# Patient Record
Sex: FEMALE | Race: BLACK OR AFRICAN AMERICAN | NOT HISPANIC OR LATINO | Employment: FULL TIME | URBAN - METROPOLITAN AREA
[De-identification: names, ages, dates, MRNs, and addresses within clinical notes are randomized per-mention and may not be internally consistent; named-entity substitution may affect disease eponyms.]

---

## 2023-07-06 DIAGNOSIS — E78.5 HYPERLIPIDEMIA, UNSPECIFIED HYPERLIPIDEMIA TYPE: ICD-10-CM

## 2023-07-06 RX ORDER — ATORVASTATIN CALCIUM 40 MG/1
40 TABLET, FILM COATED ORAL NIGHTLY
Qty: 30 TABLET | Refills: 0 | Status: SHIPPED | OUTPATIENT
Start: 2023-07-06 | End: 2023-08-14 | Stop reason: SDUPTHER

## 2023-07-06 RX ORDER — ATORVASTATIN CALCIUM 40 MG/1
40 TABLET, FILM COATED ORAL NIGHTLY
COMMUNITY
End: 2023-07-06 | Stop reason: SDUPTHER

## 2023-08-09 DIAGNOSIS — I10 HYPERTENSION, UNSPECIFIED TYPE: ICD-10-CM

## 2023-08-09 PROBLEM — J34.89 NASAL CRUSTING: Status: ACTIVE | Noted: 2023-08-09

## 2023-08-09 PROBLEM — J30.1 ALLERGIC RHINITIS DUE TO POLLEN: Status: ACTIVE | Noted: 2023-08-09

## 2023-08-09 PROBLEM — J32.3 SPHENOID SINUSITIS: Status: ACTIVE | Noted: 2023-08-09

## 2023-08-09 PROBLEM — M19.011 DEGENERATIVE JOINT DISEASE OF RIGHT ACROMIOCLAVICULAR JOINT: Status: ACTIVE | Noted: 2023-08-09

## 2023-08-09 PROBLEM — J32.4 CHRONIC PANSINUSITIS: Status: ACTIVE | Noted: 2023-08-09

## 2023-08-09 PROBLEM — F17.200 TOBACCO USE DISORDER: Status: ACTIVE | Noted: 2023-08-09

## 2023-08-09 PROBLEM — J20.9 ACUTE BRONCHITIS: Status: ACTIVE | Noted: 2023-08-09

## 2023-08-09 PROBLEM — R94.39 ABNORMAL CARDIOVASCULAR STRESS TEST: Status: ACTIVE | Noted: 2023-08-09

## 2023-08-09 PROBLEM — R94.31 EKG, ABNORMAL: Status: ACTIVE | Noted: 2023-08-09

## 2023-08-09 PROBLEM — R06.83 SNORING: Status: ACTIVE | Noted: 2023-08-09

## 2023-08-09 PROBLEM — S92.024A CLOSED NONDISPLACED FRACTURE OF ANTERIOR PROCESS OF RIGHT CALCANEUS: Status: ACTIVE | Noted: 2023-08-09

## 2023-08-09 PROBLEM — G43.109 MIGRAINE WITH AURA AND WITHOUT STATUS MIGRAINOSUS, NOT INTRACTABLE: Status: ACTIVE | Noted: 2023-08-09

## 2023-08-09 PROBLEM — R73.9 ELEVATED SERUM GLUCOSE: Status: ACTIVE | Noted: 2023-08-09

## 2023-08-09 PROBLEM — K64.9 HEMORRHOIDS: Status: ACTIVE | Noted: 2023-08-09

## 2023-08-09 PROBLEM — E66.9 OBESITY (BMI 30.0-34.9): Status: ACTIVE | Noted: 2023-08-09

## 2023-08-09 PROBLEM — E66.811 OBESITY (BMI 30.0-34.9): Status: ACTIVE | Noted: 2023-08-09

## 2023-08-09 PROBLEM — L71.9 ROSACEA: Status: ACTIVE | Noted: 2023-08-09

## 2023-08-09 PROBLEM — L57.0 AK (ACTINIC KERATOSIS): Status: ACTIVE | Noted: 2023-08-09

## 2023-08-09 PROBLEM — I67.1 NONRUPTURED CEREBRAL ANEURYSM (HHS-HCC): Status: ACTIVE | Noted: 2023-08-09

## 2023-08-09 PROBLEM — M25.512 LEFT SHOULDER PAIN: Status: ACTIVE | Noted: 2023-08-09

## 2023-08-09 PROBLEM — N95.2 VAGINAL ATROPHY: Status: ACTIVE | Noted: 2023-08-09

## 2023-08-09 PROBLEM — J34.2 DEVIATED SEPTUM: Status: ACTIVE | Noted: 2023-08-09

## 2023-08-09 PROBLEM — J34.3 HYPERTROPHY OF NASAL TURBINATES: Status: ACTIVE | Noted: 2023-08-09

## 2023-08-09 PROBLEM — R21 RASH/SKIN ERUPTION: Status: ACTIVE | Noted: 2023-08-09

## 2023-08-09 PROBLEM — E78.5 HYPERLIPEMIA: Status: ACTIVE | Noted: 2023-08-09

## 2023-08-09 RX ORDER — IPRATROPIUM BROMIDE AND ALBUTEROL SULFATE 2.5; .5 MG/3ML; MG/3ML
SOLUTION RESPIRATORY (INHALATION)
COMMUNITY
Start: 2019-06-16

## 2023-08-09 RX ORDER — ESTRADIOL 0.1 MG/G
CREAM VAGINAL
COMMUNITY
Start: 2021-07-15

## 2023-08-09 RX ORDER — TOPIRAMATE 100 MG/1
TABLET, FILM COATED ORAL
COMMUNITY
Start: 2021-03-31 | End: 2024-04-03

## 2023-08-09 RX ORDER — FLUTICASONE PROPIONATE 50 MCG
SPRAY, SUSPENSION (ML) NASAL
COMMUNITY

## 2023-08-09 RX ORDER — ASPIRIN 81 MG/1
TABLET ORAL
COMMUNITY
Start: 2019-01-18

## 2023-08-09 RX ORDER — LISINOPRIL AND HYDROCHLOROTHIAZIDE 10; 12.5 MG/1; MG/1
1 TABLET ORAL
COMMUNITY
Start: 2019-05-29 | End: 2023-08-09 | Stop reason: SDUPTHER

## 2023-08-09 RX ORDER — ACETAMINOPHEN 500 MG
500 TABLET ORAL EVERY 6 HOURS PRN
COMMUNITY
Start: 2023-03-23

## 2023-08-09 RX ORDER — IBUPROFEN 600 MG/1
TABLET ORAL
COMMUNITY
Start: 2023-03-23

## 2023-08-09 RX ORDER — MINERAL OIL
ENEMA (ML) RECTAL
COMMUNITY

## 2023-08-09 RX ORDER — CAPSAICIN 0.03 G/100G
CREAM TOPICAL
COMMUNITY
Start: 2019-01-14

## 2023-08-10 RX ORDER — LISINOPRIL AND HYDROCHLOROTHIAZIDE 10; 12.5 MG/1; MG/1
1 TABLET ORAL
Qty: 30 TABLET | Refills: 0 | Status: SHIPPED | OUTPATIENT
Start: 2023-08-10 | End: 2023-08-11 | Stop reason: SDUPTHER

## 2023-08-11 DIAGNOSIS — I10 HYPERTENSION, UNSPECIFIED TYPE: ICD-10-CM

## 2023-08-11 DIAGNOSIS — E78.5 HYPERLIPIDEMIA, UNSPECIFIED HYPERLIPIDEMIA TYPE: ICD-10-CM

## 2023-08-14 RX ORDER — ATORVASTATIN CALCIUM 40 MG/1
40 TABLET, FILM COATED ORAL NIGHTLY
Qty: 30 TABLET | Refills: 0 | Status: SHIPPED | OUTPATIENT
Start: 2023-08-14 | End: 2023-09-22 | Stop reason: SDUPTHER

## 2023-08-14 RX ORDER — LISINOPRIL AND HYDROCHLOROTHIAZIDE 10; 12.5 MG/1; MG/1
1 TABLET ORAL
Qty: 30 TABLET | Refills: 0 | Status: SHIPPED | OUTPATIENT
Start: 2023-08-14 | End: 2024-04-03 | Stop reason: ALTCHOICE

## 2023-09-20 DIAGNOSIS — E78.5 HYPERLIPIDEMIA, UNSPECIFIED HYPERLIPIDEMIA TYPE: ICD-10-CM

## 2023-09-20 RX ORDER — ATORVASTATIN CALCIUM 40 MG/1
40 TABLET, FILM COATED ORAL NIGHTLY
Qty: 30 TABLET | Refills: 0 | OUTPATIENT
Start: 2023-09-20

## 2023-09-22 DIAGNOSIS — E78.5 HYPERLIPIDEMIA, UNSPECIFIED HYPERLIPIDEMIA TYPE: ICD-10-CM

## 2023-09-22 RX ORDER — ATORVASTATIN CALCIUM 40 MG/1
40 TABLET, FILM COATED ORAL NIGHTLY
Qty: 30 TABLET | Refills: 0 | Status: SHIPPED | OUTPATIENT
Start: 2023-09-22 | End: 2024-04-03 | Stop reason: ALTCHOICE

## 2024-03-27 ENCOUNTER — HOSPITAL ENCOUNTER (OUTPATIENT)
Dept: RADIOLOGY | Facility: CLINIC | Age: 64
Discharge: HOME | End: 2024-03-27
Payer: COMMERCIAL

## 2024-03-27 ENCOUNTER — APPOINTMENT (OUTPATIENT)
Dept: RADIOLOGY | Facility: CLINIC | Age: 64
End: 2024-03-27
Payer: COMMERCIAL

## 2024-03-27 VITALS — WEIGHT: 119 LBS | BODY MASS INDEX: 20.32 KG/M2 | HEIGHT: 64 IN

## 2024-03-27 DIAGNOSIS — Z12.31 SCREENING MAMMOGRAM FOR BREAST CANCER: ICD-10-CM

## 2024-03-27 PROCEDURE — 77067 SCR MAMMO BI INCL CAD: CPT

## 2024-03-27 PROCEDURE — 77063 BREAST TOMOSYNTHESIS BI: CPT | Performed by: RADIOLOGY

## 2024-03-27 PROCEDURE — 77067 SCR MAMMO BI INCL CAD: CPT | Performed by: RADIOLOGY

## 2024-04-03 ENCOUNTER — OFFICE VISIT (OUTPATIENT)
Dept: PRIMARY CARE | Facility: CLINIC | Age: 64
End: 2024-04-03
Payer: COMMERCIAL

## 2024-04-03 ENCOUNTER — APPOINTMENT (OUTPATIENT)
Dept: PRIMARY CARE | Facility: CLINIC | Age: 64
End: 2024-04-03
Payer: COMMERCIAL

## 2024-04-03 VITALS
WEIGHT: 121.6 LBS | HEART RATE: 76 BPM | OXYGEN SATURATION: 97 % | DIASTOLIC BLOOD PRESSURE: 80 MMHG | BODY MASS INDEX: 20.76 KG/M2 | HEIGHT: 64 IN | SYSTOLIC BLOOD PRESSURE: 132 MMHG

## 2024-04-03 DIAGNOSIS — J42 CHRONIC BRONCHITIS, UNSPECIFIED CHRONIC BRONCHITIS TYPE (MULTI): ICD-10-CM

## 2024-04-03 DIAGNOSIS — Z76.89 ENCOUNTER TO ESTABLISH CARE: Primary | ICD-10-CM

## 2024-04-03 DIAGNOSIS — Z71.6 ENCOUNTER FOR SMOKING CESSATION COUNSELING: ICD-10-CM

## 2024-04-03 DIAGNOSIS — R94.31 ABNORMAL EKG: ICD-10-CM

## 2024-04-03 DIAGNOSIS — R22.0 SCALP MASS: ICD-10-CM

## 2024-04-03 PROCEDURE — 3075F SYST BP GE 130 - 139MM HG: CPT | Performed by: STUDENT IN AN ORGANIZED HEALTH CARE EDUCATION/TRAINING PROGRAM

## 2024-04-03 PROCEDURE — 99214 OFFICE O/P EST MOD 30 MIN: CPT | Performed by: STUDENT IN AN ORGANIZED HEALTH CARE EDUCATION/TRAINING PROGRAM

## 2024-04-03 PROCEDURE — 4004F PT TOBACCO SCREEN RCVD TLK: CPT | Performed by: STUDENT IN AN ORGANIZED HEALTH CARE EDUCATION/TRAINING PROGRAM

## 2024-04-03 PROCEDURE — 3078F DIAST BP <80 MM HG: CPT | Performed by: STUDENT IN AN ORGANIZED HEALTH CARE EDUCATION/TRAINING PROGRAM

## 2024-04-03 RX ORDER — VARENICLINE TARTRATE 1 MG/1
1 TABLET, FILM COATED ORAL 2 TIMES DAILY
Qty: 60 TABLET | Refills: 2 | Status: SHIPPED | OUTPATIENT
Start: 2024-04-03 | End: 2024-07-02

## 2024-04-03 RX ORDER — NEBULIZER AND COMPRESSOR
1 EACH MISCELLANEOUS 4 TIMES DAILY PRN
Qty: 1 EACH | Refills: 0 | Status: SHIPPED | OUTPATIENT
Start: 2024-04-03

## 2024-04-03 RX ORDER — VARENICLINE TARTRATE 0.5 (11)-1
0.5 KIT ORAL 2 TIMES DAILY
Qty: 53 EACH | Refills: 0 | Status: SHIPPED | OUTPATIENT
Start: 2024-04-03 | End: 2024-07-02

## 2024-04-03 NOTE — PROGRESS NOTES
"Subjective   Patient ID: Cory Murillo is a 63 y.o. female who presents for New Patient Visit (Establish care. Patient declined flu shot. ).        HPI  64 y/o female with hx of HTN, HLD, migraines presenting to establish care.   Est care  Pt's PMH, PSH, SH, FH , meds and allergies was obtained / reviewed and updated .     -Would like referral for removal of benign scalp growth.  -lost 80 lbs via diet and exercise over 2-3 years  -was taking lisinopril/hydrochlorothiazide and atorvastatin, stopped taking after weight loss  -currently takes aspirin 81mg, motrin/tylenol OTC for migraine headaches (no longer taking topiramate)   -has fear of needles, would like preparation before getting labs drawn   -Tobacco use - 3/4 pack per day, quit 5 years ago w/ chantix, would like to quit again  Smoked from 19- 41 1ppd- 1.5 ppd   -Duo-Neb nebulizer broken      Visit Vitals  /80   Pulse 76   Ht 1.626 m (5' 4\")   Wt 55.2 kg (121 lb 9.6 oz)   SpO2 97%   BMI 20.87 kg/m²   OB Status Postmenopausal   Smoking Status Every Day   BSA 1.58 m²      No LMP recorded. Patient is postmenopausal.     Review of Systems    Constitutional : No feeling poorly / fevers/ chills / night sweats/ fatigue   Cardiovascular : No CP /Palpitations/ lower extremity edema / syncope   Respiratory : No Cough /MATTA/Dyspnea at rest   Gastrointestinal : No abd pain / N/V  No bloody stools/ melena / constipation  Endo : No polyuria/polydipsia/ muscle weakness / sluggishness   CNS: No confusion / HA/ tingling/ numbness/ weakness of extremities  Psychiatric: No anxiety/ depression/ SI/HI    All other systems have been reviewed and are negative for complaint       Physical Exam    Constitutional : Vitals reviewed. Alert and in no distress  Cardiovascular : RRR, Normal S1, S2, No pericardial rub/ gallop, no peripheral edema   Pulmonary: No respiratory distress, CTAB   Neurologic : CNs 2-12 grossly intact , no obvious FNDs  Psych : A,Ox3, normal mood and affect  "     Assessment/Plan   Diagnoses and all orders for this visit:  Chronic bronchitis, unspecified chronic bronchitis type (CMS/HCC)  -     nebulizer and compressor device; 1 Units 4 times a day as needed (fro difficulty breathing).  Abnormal EKG  -     Referral to Cardiology; Future  Encounter for smoking cessation counseling  -     varenicline (Chantix Starting Month Box) 0.5 mg (11)- 1 mg (42) tablet; Take 0.5 mg by mouth 2 times a day.  -     varenicline (Chantix Continuing Month Box) 1 mg tablet; Take 1 tablet (1 mg) by mouth 2 times a day. Take with full glass of water.  Scalp mass  -     Referral to Dermatology      62 y/o female with hx of HTN, HLD, with resolution of these on weight loss and hence no longer on rx , nicotine dependence ,  migraines presenting to establish care.     -Manual /80, controlled w/ diet/exercise   -  chantix for smoking cessation   -Dermatology  referral for s.c scalp mass  ( non tender, firm , mobile , on the right parietal area ) removal   -Order routine labs   - inverted T wave changes  on EKG , no EKG available for review, but noted in recent ER visit noted  Cardiac stress test in 2020   Cardiology referral due to risk factors  ( current smoker, age  )       Conditions addressed and mgmt as noted above.  Pertinent labs, images/ imaging reports , chart review was done .   Age appropriate labs / labs for mgmt of chronic medical conditions ordered, further mgmt pending the results.       Note includes contributions from [employer]  Christiano Guardado      RTO  in 6m for CPE

## 2024-04-29 ENCOUNTER — TELEMEDICINE (OUTPATIENT)
Dept: PRIMARY CARE | Facility: CLINIC | Age: 64
End: 2024-04-29
Payer: COMMERCIAL

## 2024-04-29 DIAGNOSIS — F41.8 SITUATIONAL ANXIETY: Primary | ICD-10-CM

## 2024-04-29 PROBLEM — I67.1 NONRUPTURED CEREBRAL ANEURYSM (HHS-HCC): Status: RESOLVED | Noted: 2023-08-09 | Resolved: 2024-04-29

## 2024-04-29 PROCEDURE — 4004F PT TOBACCO SCREEN RCVD TLK: CPT | Performed by: STUDENT IN AN ORGANIZED HEALTH CARE EDUCATION/TRAINING PROGRAM

## 2024-04-29 PROCEDURE — 99213 OFFICE O/P EST LOW 20 MIN: CPT | Performed by: STUDENT IN AN ORGANIZED HEALTH CARE EDUCATION/TRAINING PROGRAM

## 2024-04-29 RX ORDER — ALPRAZOLAM 0.25 MG/1
TABLET ORAL
Qty: 4 TABLET | Refills: 0 | Status: SHIPPED | OUTPATIENT
Start: 2024-04-29

## 2024-04-29 NOTE — PROGRESS NOTES
Subjective   Patient ID: Cory Murillo is a 63 y.o. female who presents for Follow-up (Discuss medication while getting a shot in her head. ).        HPI    Excision of scalp mass on 5/9   Pt anxious abt the procedure , has needle anxiety   Derm recommended that she get the med from her primary physician     Visit Vitals  OB Status Postmenopausal   Smoking Status Every Day      No LMP recorded. Patient is postmenopausal.   Current Outpatient Medications   Medication Instructions    acetaminophen (TYLENOL) 500 mg, oral, Every 6 hours PRN    aspirin 81 mg EC tablet oral    capsaicin (Zostrix) 0.025 % cream APPLY EXTERNALLY TO THE AFFECTED AREA 3 TO 4 TIMES DAILY AS NEEDED FOR PAIN    estradiol (Estrace) 0.01 % (0.1 mg/gram) vaginal cream vaginal    fexofenadine (Allegra Allergy) 180 mg tablet oral    fluticasone (Flonase Allergy Relief) 50 mcg/actuation nasal spray nasal    ibuprofen 600 mg tablet TAKE 1 TABLET BY MOUTH EVERY 8 HOURS FOR UP TO 7 DAYS AS NEEDED FOR PAIN OR FEVER    ipratropium-albuteroL (Duo-Neb) 0.5-2.5 mg/3 mL nebulizer solution inhalation    nebulizer and compressor device 1 Units, miscellaneous, 4 times daily PRN    varenicline (CHANTIX CONTINUING MONTH BOX) 1 mg, oral, 2 times daily, Take with full glass of water.    varenicline (Chantix Starting Month Box) 0.5 mg (11)- 1 mg (42) tablet 0.5 mg, oral, 2 times daily      Social History     Tobacco Use    Smoking status: Every Day     Types: Cigarettes    Smokeless tobacco: Never   Substance Use Topics    Alcohol use: Yes     Comment: Occasionally        Review of Systems    As noted in HPI     Physical Exam    Limited physical due to the virtual nature of this visit ( real time audio- visual )  Constitutional : Alert, in no distress     Pulm : Normal work of breathing   CNS : Moves all extremities symmetrically   Psych:  A , O X 3 normal mood and effect, speaks coherently     Assessment/Plan   Diagnoses and all orders for this visit:  Situational  anxiety  -     ALPRAZolam (Xanax) 0.25 mg tablet; Take 1-2 tablets 30 mins before procedure      Situational anxiety , scalp mass removal at Derm's office on 5/9   Above rx to ease the anxiety   RTO as previously advised       Conditions addressed and mgmt as noted above.  Pertinent labs, images/ imaging reports , chart review was done .

## 2024-05-08 ENCOUNTER — OFFICE VISIT (OUTPATIENT)
Dept: OBSTETRICS AND GYNECOLOGY | Facility: CLINIC | Age: 64
End: 2024-05-08
Payer: COMMERCIAL

## 2024-05-08 VITALS
DIASTOLIC BLOOD PRESSURE: 73 MMHG | WEIGHT: 117 LBS | HEIGHT: 64 IN | SYSTOLIC BLOOD PRESSURE: 129 MMHG | BODY MASS INDEX: 19.97 KG/M2

## 2024-05-08 DIAGNOSIS — Z12.4 CERVICAL CANCER SCREENING: ICD-10-CM

## 2024-05-08 DIAGNOSIS — Z01.419 WELL WOMAN EXAM: Primary | ICD-10-CM

## 2024-05-08 PROCEDURE — 87624 HPV HI-RISK TYP POOLED RSLT: CPT

## 2024-05-08 PROCEDURE — 3078F DIAST BP <80 MM HG: CPT | Performed by: OBSTETRICS & GYNECOLOGY

## 2024-05-08 PROCEDURE — 99396 PREV VISIT EST AGE 40-64: CPT | Performed by: OBSTETRICS & GYNECOLOGY

## 2024-05-08 PROCEDURE — 4004F PT TOBACCO SCREEN RCVD TLK: CPT | Performed by: OBSTETRICS & GYNECOLOGY

## 2024-05-08 PROCEDURE — 88175 CYTOPATH C/V AUTO FLUID REDO: CPT

## 2024-05-08 PROCEDURE — 3074F SYST BP LT 130 MM HG: CPT | Performed by: OBSTETRICS & GYNECOLOGY

## 2024-05-08 ASSESSMENT — ENCOUNTER SYMPTOMS
BACK PAIN: 0
SLEEP DISTURBANCE: 0
DIARRHEA: 0
APPETITE CHANGE: 0
COLOR CHANGE: 0
CONSTIPATION: 0
UNEXPECTED WEIGHT CHANGE: 0
ABDOMINAL PAIN: 0
FEVER: 0
SHORTNESS OF BREATH: 0
FATIGUE: 0
VOMITING: 0
ABDOMINAL DISTENTION: 0
CHILLS: 0
HEMATURIA: 0
FLANK PAIN: 0
NAUSEA: 0
FREQUENCY: 0
BLOOD IN STOOL: 0
DYSURIA: 0

## 2024-05-08 ASSESSMENT — PAIN SCALES - GENERAL: PAINLEVEL: 0-NO PAIN

## 2024-05-08 NOTE — PROGRESS NOTES
"History Of Present Illness  Routine Gyn Exam  Cory Murillo here for routine WWE.  Pt is postmenopausal.  Denies spotting or bleeding.     Concerns: none.   Stopped using vaginal estrogen cream, did not feel it was helpful.     New health issues or surgeries since last visit: denies .  Exercise: yoga, barre, hiking, walking.     Gynecologic History  Postmenopausal.  Sexually active: yes.  Last Pap: .   Last mammogram: .   Last Colonoscopy:  up to date.     Obstetric History  OB History    Para Term  AB Living   1 1 1         SAB IAB Ectopic Multiple Live Births                  # Outcome Date GA Lbr Alex/2nd Weight Sex Delivery Anes PTL Lv   1 Term                 Review of Systems   Constitutional:  Negative for appetite change, chills, fatigue, fever and unexpected weight change.   Respiratory:  Negative for shortness of breath.    Cardiovascular:  Negative for chest pain.   Gastrointestinal:  Negative for abdominal distention, abdominal pain, blood in stool, constipation, diarrhea, nausea and vomiting.   Endocrine: Negative for cold intolerance and heat intolerance.   Genitourinary:  Negative for dyspareunia, dysuria, flank pain, frequency, genital sores, hematuria, menstrual problem, pelvic pain, urgency, vaginal bleeding, vaginal discharge and vaginal pain.   Musculoskeletal:  Negative for back pain.   Skin:  Negative for color change.   Psychiatric/Behavioral:  Negative for sleep disturbance.        /73 (BP Location: Left arm, Patient Position: Sitting)   Ht 1.626 m (5' 4\")   Wt 53.1 kg (117 lb)   BMI 20.08 kg/m²      Physical Exam  Constitutional:       Appearance: Normal appearance.   HENT:      Head: Normocephalic and atraumatic.   Chest:   Breasts:     Right: Normal.      Left: Normal.   Abdominal:      General: Abdomen is flat.      Palpations: Abdomen is soft.      Tenderness: There is no abdominal tenderness.   Genitourinary:     General: Normal vulva.      Vagina: Normal. "      Cervix: Normal.      Uterus: Normal.       Adnexa: Right adnexa normal and left adnexa normal.      Comments: Pap collected today    Skin:     General: Skin is warm and dry.   Neurological:      Mental Status: She is alert and oriented to person, place, and time.   Psychiatric:         Mood and Affect: Mood normal.              Assessment/Plan         Routine Well Woman Exam Today  Discussed diet and exercise.   Reviewed routine health screenings.   Pap:  Recommend annual mammograms.                Sana Damon MD

## 2024-05-09 ENCOUNTER — APPOINTMENT (OUTPATIENT)
Dept: CARDIOLOGY | Facility: HOSPITAL | Age: 64
End: 2024-05-09
Payer: COMMERCIAL

## 2024-05-16 ENCOUNTER — APPOINTMENT (OUTPATIENT)
Dept: CARDIOLOGY | Facility: HOSPITAL | Age: 64
End: 2024-05-16
Payer: COMMERCIAL

## 2024-05-17 LAB
CYTOLOGY CMNT CVX/VAG CYTO-IMP: NORMAL
HPV HR 12 DNA GENITAL QL NAA+PROBE: NEGATIVE
HPV HR GENOTYPES PNL CVX NAA+PROBE: NEGATIVE
HPV16 DNA SPEC QL NAA+PROBE: NEGATIVE
HPV18 DNA SPEC QL NAA+PROBE: NEGATIVE
LAB AP HPV GENOTYPE QUESTION: YES
LAB AP HPV HR: NORMAL
LABORATORY COMMENT REPORT: NORMAL
PATH REPORT.TOTAL CANCER: NORMAL

## 2024-05-23 ENCOUNTER — APPOINTMENT (OUTPATIENT)
Dept: GASTROENTEROLOGY | Facility: HOSPITAL | Age: 64
End: 2024-05-23
Payer: COMMERCIAL

## 2024-07-02 NOTE — PROGRESS NOTES
Gastroenterology Clinic Consult Note    Reason For Consult  Rectal bleeding    History Of Present Illness  Cory Murillo is a 63 y.o. female with a past medical history of prior HTN/HLD resolved after weight loss, tobacco abuse c/b chronic bronchitis who presents to GI clinic with rectal bleeding    Has had hemorrhoids banded in 2008/2009 in [place]. Since then, has intermittent bleeding on toilet paper with hard stools    Over the last 3-4 months, Has been seeing rectal bleeding every few days. For the last 3-4 month. Painless. Feels swollen tissues back there, has to wear a pad because sometimes bleeds on its own and she has been told that she has blood staining the back of her clothes recently. Has 1 soft BM daily in the mornings. NO recent weight loss or N/V abodminal pain or diarrhea    Uses tucks, prep H cream and suppository, sitz baths. She inquires now about hemorrhoid banding. She doesn't want surgery because she cannot afford the down time required to recover due to her work. Currently trying to get a program up and running in her role as a     She is very scared of needle sticks and is very hesitant/not excited about blood draw. WE reviewed her prior 2022 colonoscopy. She did not know she had several polyps removed. She did not know she was supposed to have a repeat procedure in 1 year. She tells me she did know she had a poor prep because she only drank half of her bowel prep - prescription not sent to right pharmacy so by the time this was rectified, she was only able to  the prep at 11pm and only had time to drink half of it.     Past Medical History  Past Medical History:   Diagnosis Date    Essential (primary) hypertension     Benign essential hypertension    Hyperlipidemia        Surgical History  Past Surgical History:   Procedure Laterality Date    CT HEAD ANGIO W AND WO IV CONTRAST  2/15/2019    CT HEAD ANGIO W AND WO IV CONTRAST 2/15/2019 [facility] ANCILLARY LEGACY    MR HEAD  ANGIO WO IV CONTRAST  2019    MR HEAD ANGIO WO IV CONTRAST 2019 [facility] CLINICAL LEGACY    OTHER SURGICAL HISTORY  2019    Tonsillectomy    OTHER SURGICAL HISTORY  2019    [place] tooth extraction    OTHER SURGICAL HISTORY  2019     section       Social History  Social Determinants of Health     Tobacco Use: High Risk (2024)    Patient History     Smoking Tobacco Use: Some Days     Smokeless Tobacco Use: Never     Passive Exposure: Never   Alcohol Use: Not At Risk (2024)    AUDIT-C     Frequency of Alcohol Consumption: Monthly or less     Average Number of Drinks: 1 or 2     Frequency of Binge Drinking: Never   Financial Resource Strain: Not on file   Food Insecurity: No Food Insecurity (2024)    Hunger Vital Sign     Worried About Running Out of Food in the Last Year: Never true     Ran Out of Food in the Last Year: Never true   Transportation Needs: Not on file   Physical Activity: Not on file   Stress: Not on file   Social Connections: Not on file   Intimate Partner Violence: Not on file   Depression: Not at risk (2024)    PHQ-2     PHQ-2 Score: 0   Housing Stability: Not on file   Utilities: Not on file   Digital Equity: Not on file   Health Literacy: Not on file       Family History  Family History   Problem Relation Name Age of Onset    Hypertension Mother      Hyperlipidemia Mother      Prostate cancer Father      Migraines Father      Hypertension Father      Hyperlipidemia Father      Allergies Sister      Migraines Brother      Allergies Brother      Allergies Son      Migraines Son      Aneurysm Maternal Grandmother      Alcohol abuse Maternal Grandfather      Cirrhosis Maternal Grandfather      Other (Heart problems) Paternal Grandmother      Asthma Paternal Grandfather          Allergies  Allergies   Allergen Reactions    Diphenhydramine Anaphylaxis, Itching and Other     CAUSES UVULA TO SWELL    CAUSES UVULA TO SWELL   CAUSES UVULA TO SWELL     Morphine Hives, Itching and Other    Oxycodone-Acetaminophen Hives     Severe itching    Severe itching   Severe itching    Bee Pollen Other     SEVERE NASAL CONGESTION    SEVERE NASAL CONGESTION   SEVERE NASAL CONGESTION    Codeine Hives and Other    Dog Dander Hives    Hydrocodone-Acetaminophen Hives and Itching     itch    itch   itch   itch    Mold Itching     Itchy eyes    Oxycodone Itching and Rash       Home Medications    Current Outpatient Medications:     acetaminophen (Tylenol) 500 mg tablet, Take 1 tablet (500 mg) by mouth every 6 hours if needed for fever (temp greater than 38.0 C)., Disp: , Rfl:     ALPRAZolam (Xanax) 0.25 mg tablet, Take 1-2 tablets 30 mins before procedure, Disp: 4 tablet, Rfl: 0    aspirin 81 mg EC tablet, Take by mouth., Disp: , Rfl:     capsaicin (Zostrix) 0.025 % cream, APPLY EXTERNALLY TO THE AFFECTED AREA 3 TO 4 TIMES DAILY AS NEEDED FOR PAIN, Disp: , Rfl:     estradiol (Estrace) 0.01 % (0.1 mg/gram) vaginal cream, Insert into the vagina., Disp: , Rfl:     fexofenadine (Allegra Allergy) 180 mg tablet, Take by mouth., Disp: , Rfl:     fluticasone (Flonase Allergy Relief) 50 mcg/actuation nasal spray, Administer into affected nostril(s)., Disp: , Rfl:     ibuprofen 600 mg tablet, TAKE 1 TABLET BY MOUTH EVERY 8 HOURS FOR UP TO 7 DAYS AS NEEDED FOR PAIN OR FEVER, Disp: , Rfl:     ipratropium-albuteroL (Duo-Neb) 0.5-2.5 mg/3 mL nebulizer solution, Inhale., Disp: , Rfl:     nebulizer and compressor device, 1 Units 4 times a day as needed (fro difficulty breathing)., Disp: 1 each, Rfl: 0    hydrocortisone (Anusol-HC) 25 mg suppository, Insert 1 suppository (25 mg) into the rectum 2 times a day as needed for hemorrhoids for up to 7 days. Take prior to procedure as directed, Disp: 7 suppository, Rfl: 2    varenicline (Chantix Continuing Month Box) 1 mg tablet, Take 1 tablet (1 mg) by mouth 2 times a day. Take with full glass of water., Disp: 60 tablet, Rfl: 2    varenicline (Chantix  Starting Month Box) 0.5 mg (11)- 1 mg (42) tablet, Take 0.5 mg by mouth 2 times a day., Disp: 53 each, Rfl: 0    Current Facility-Administered Medications:     polyethylene glycol-electrolytes (Nulytely) solution 4,000 mL, 4,000 mL, oral, Once, Daiana Velasquez MD    Review of Systems  As above     Physical Exam  General: thin but well-nourished, no acute distress  HEENT: PERRLA, EOM intact, no scleral icterus, moist MM  Respiratory: CTA bilaterally, normal work of breathing  Cardiovascular: RRR, no murmurs/rubs/gallops  Abdomen: Soft, nontender, nondistended, bowel sounds   DENISE: Rectal prolapse, non bleeding. Small external hemorrhoids with extra perianal tissue. No masses palpated. Brown stool in rectal vault  Extremities: no edema, no asterixis  Neuro: alert and oriented, CNII-XII grossly intact, moves all 4 extremities with no focal deficits     Last Recorded Vitals  Blood pressure 135/85, pulse 80, temperature 36.5 °C (97.7 °F), weight 52.2 kg (115 lb 1.6 oz), SpO2 97%.      Relevant Results    Current Outpatient Medications:     acetaminophen (Tylenol) 500 mg tablet, Take 1 tablet (500 mg) by mouth every 6 hours if needed for fever (temp greater than 38.0 C)., Disp: , Rfl:     ALPRAZolam (Xanax) 0.25 mg tablet, Take 1-2 tablets 30 mins before procedure, Disp: 4 tablet, Rfl: 0    aspirin 81 mg EC tablet, Take by mouth., Disp: , Rfl:     capsaicin (Zostrix) 0.025 % cream, APPLY EXTERNALLY TO THE AFFECTED AREA 3 TO 4 TIMES DAILY AS NEEDED FOR PAIN, Disp: , Rfl:     estradiol (Estrace) 0.01 % (0.1 mg/gram) vaginal cream, Insert into the vagina., Disp: , Rfl:     fexofenadine (Allegra Allergy) 180 mg tablet, Take by mouth., Disp: , Rfl:     fluticasone (Flonase Allergy Relief) 50 mcg/actuation nasal spray, Administer into affected nostril(s)., Disp: , Rfl:     ibuprofen 600 mg tablet, TAKE 1 TABLET BY MOUTH EVERY 8 HOURS FOR UP TO 7 DAYS AS NEEDED FOR PAIN OR FEVER, Disp: , Rfl:     ipratropium-albuteroL (Duo-Neb)  0.5-2.5 mg/3 mL nebulizer solution, Inhale., Disp: , Rfl:     nebulizer and compressor device, 1 Units 4 times a day as needed (fro difficulty breathing)., Disp: 1 each, Rfl: 0    hydrocortisone (Anusol-HC) 25 mg suppository, Insert 1 suppository (25 mg) into the rectum 2 times a day as needed for hemorrhoids for up to 7 days. Take prior to procedure as directed, Disp: 7 suppository, Rfl: 2    varenicline (Chantix Continuing Month Box) 1 mg tablet, Take 1 tablet (1 mg) by mouth 2 times a day. Take with full glass of water., Disp: 60 tablet, Rfl: 2    varenicline (Chantix Starting Month Box) 0.5 mg (11)- 1 mg (42) tablet, Take 0.5 mg by mouth 2 times a day., Disp: 53 each, Rfl: 0    Current Facility-Administered Medications:     polyethylene glycol-electrolytes (Nulytely) solution 4,000 mL, 4,000 mL, oral, Once, Daiana Velasquez MD     Lab Results   Component Value Date    WBC 6.1 10/21/2022    HGB 14.3 10/21/2022    HCT 42.7 10/21/2022    MCV 92 10/21/2022     10/21/2022     Lab Results   Component Value Date    GLUCOSE 44 (LL) 10/21/2022    CALCIUM 9.6 10/21/2022     10/21/2022    K 4.0 10/21/2022    CO2 31 10/21/2022     10/21/2022    BUN 11 10/21/2022    CREATININE 1.14 (H) 10/21/2022     Lab Results   Component Value Date    ALT 10 10/21/2022    AST 16 10/21/2022    ALKPHOS 58 10/21/2022    BILITOT 0.3 10/21/2022       Imaging:  No recent abdominal imaging    Procedures:    3/2022 Colonoscopy  Findings:       Hemorrhoids were found on perianal exam.       A 6 mm polyp was found in the sigmoid colon. The polyp was sessile. The        polyp was removed with a jumbo cold forceps. Resection and retrieval        were complete. Verification of patient identification for the specimen        was done by the physician and nurse using the patient's name, birth date        and medical record number. Estimated blood loss was minimal.       A 6 mm polyp was found in the sigmoid colon. The polyp was sessile.  The        polyp was removed with a cold snare. Resection and retrieval were        complete. Verification of patient identification for the specimen was        done by the physician and nurse using the patient's name, birth date and        medical record number. Estimated blood loss was minimal.       A 10 mm polyp was found in the descending colon. The polyp was sessile.        The polyp was removed with a cold snare. Resection and retrieval were        complete. Verification of patient identification for the specimen was        done by the physician and nurse using the patient's name, birth date and        medical record number. Estimated blood loss was minimal.       A 6 mm polyp was found in the transverse colon. The polyp was sessile.        Verification of patient identification for the specimen was done by the        physician and nurse using the patient's name, birth date and medical        record number. Estimated blood loss was minimal.       An 8 mm polyp was found in the descending colon. The polyp was sessile.        The polyp was removed with a cold snare. Resection and retrieval were        complete. Verification of patient identification for the specimen was        done by the physician and nurse using the patient's name, birth date and        medical record number. Estimated blood loss was minimal.    Impression:            - Preparation of the colon was inadequate.                         - Hemorrhoids found on perianal exam.                         - One 6 mm polyp in the sigmoid colon, removed with a                          jumbo cold forceps. Resected and retrieved.                         - One 6 mm polyp in the sigmoid colon, removed with a                          cold snare. Resected and retrieved.                         - One 10 mm polyp in the descending colon, removed                          with a cold snare. Resected and retrieved.                         - One 6 mm polyp in the  transverse colon.                         - One 8 mm polyp in the descending colon, removed with                          a cold snare. Resected and retrieved.    FINAL DIAGNOSIS   A.  SIGMOID COLON, POLYPECTOMY:   --TUBULAR ADENOMA.     B.  DESCENDING COLON, POLYPECTOMY:   --HYPERPLASTIC POLYP.     C.  TRANSVERSE COLON, POLYPECTOMY:   --TUBULAR ADENOMA.     D.  ASCENDING COLON, POLYPECTOMY:   --HYPERPLASTIC POLYP.     E.  SIGMOID COLON, POLYPECTOMY:   --HYPERPLASTIC POLYP.      ASSESSMENT/PLAN:  Cory Murillo is a 63 y.o. female with a past medical history of prior HTN/HLD resolved after weight loss, tobacco abuse c/b chronic bronchitis who presents to GI clinic with rectal bleeding    #Rectal bleeding  -possibly related to hemorrhoids, rectal prolapse, but given seemingly spontaneous bleeding at times, worried about polyps as well  -2022 Colonoscopy with incomplete prep and multiple polyps. She only drank half of her prep. Due now  -Plan for Colonoscopy with anesthesia given anxiety.  -Nulytely ordered to pharmacy today  -Anusol suppositories PRN     #Rectal Prolapse  -Seen on DENISE in clinic today  -Referral to PT for pelvic floor physical therapy  -Consider surgery referral in the future if patient is amenable    Patient was seen and discussed with Dr. Veronica Velasquez MD

## 2024-07-11 ENCOUNTER — OFFICE VISIT (OUTPATIENT)
Dept: GASTROENTEROLOGY | Facility: HOSPITAL | Age: 64
End: 2024-07-11
Payer: COMMERCIAL

## 2024-07-11 VITALS
BODY MASS INDEX: 19.76 KG/M2 | DIASTOLIC BLOOD PRESSURE: 85 MMHG | TEMPERATURE: 97.7 F | SYSTOLIC BLOOD PRESSURE: 135 MMHG | WEIGHT: 115.1 LBS | OXYGEN SATURATION: 97 % | HEART RATE: 80 BPM

## 2024-07-11 DIAGNOSIS — K62.3 RECTAL PROLAPSE: ICD-10-CM

## 2024-07-11 DIAGNOSIS — K62.5 RECTAL BLEEDING: Primary | ICD-10-CM

## 2024-07-11 RX ORDER — POLYETHYLENE GLYCOL 3350, SODIUM CHLORIDE, SODIUM BICARBONATE, POTASSIUM CHLORIDE 420; 11.2; 5.72; 1.48 G/4L; G/4L; G/4L; G/4L
4000 POWDER, FOR SOLUTION ORAL ONCE
Status: DISPENSED | OUTPATIENT
Start: 2024-07-11

## 2024-07-11 RX ORDER — HYDROCORTISONE ACETATE 25 MG/1
25 SUPPOSITORY RECTAL 2 TIMES DAILY PRN
Qty: 7 SUPPOSITORY | Refills: 2 | Status: SHIPPED | OUTPATIENT
Start: 2024-07-11 | End: 2024-07-18

## 2024-07-11 SDOH — ECONOMIC STABILITY: FOOD INSECURITY: WITHIN THE PAST 12 MONTHS, YOU WORRIED THAT YOUR FOOD WOULD RUN OUT BEFORE YOU GOT MONEY TO BUY MORE.: NEVER TRUE

## 2024-07-11 SDOH — ECONOMIC STABILITY: FOOD INSECURITY: WITHIN THE PAST 12 MONTHS, THE FOOD YOU BOUGHT JUST DIDN'T LAST AND YOU DIDN'T HAVE MONEY TO GET MORE.: NEVER TRUE

## 2024-07-11 ASSESSMENT — PAIN SCALES - GENERAL: PAINLEVEL: 0-NO PAIN

## 2024-07-11 ASSESSMENT — PATIENT HEALTH QUESTIONNAIRE - PHQ9
2. FEELING DOWN, DEPRESSED OR HOPELESS: NOT AT ALL
1. LITTLE INTEREST OR PLEASURE IN DOING THINGS: NOT AT ALL
SUM OF ALL RESPONSES TO PHQ9 QUESTIONS 1 & 2: 0

## 2024-07-11 ASSESSMENT — LIFESTYLE VARIABLES
HOW OFTEN DO YOU HAVE SIX OR MORE DRINKS ON ONE OCCASION: NEVER
HOW OFTEN DO YOU HAVE A DRINK CONTAINING ALCOHOL: MONTHLY OR LESS
HOW MANY STANDARD DRINKS CONTAINING ALCOHOL DO YOU HAVE ON A TYPICAL DAY: 1 OR 2
AUDIT-C TOTAL SCORE: 1
SKIP TO QUESTIONS 9-10: 1

## 2024-07-11 NOTE — PATIENT INSTRUCTIONS
It was a pleasure meeting you in clinic today    Please schedule your colonoscopy at the check out desk before you leave today  Please  your Nulytely for bowel prep at your pharmacy. You will get instructions on how/when to take this before your colonoscopy  You can use Anusol Suppositories twice daily as needed for for up to a week in a row to help calm down the inflammation in your hemorrhoids  I have referred you to physical therapy for pelvic floor physical therapy for your rectal prolapse if you are interested  Come back to see me in 3 months. We will review your colonoscopy fi

## 2024-07-17 NOTE — PROGRESS NOTES
I saw and evaluated the patient. I personally obtained the key and critical portions of the history and physical exam or was physically present for key and critical portions performed by the resident/fellow. I reviewed the resident/fellow's documentation and discussed the patient with the resident/fellow. I agree with the resident/fellow's medical decision making as documented in the note.    I spent 15 minutes in the professional and overall care of this patient.    Mio Martin MD

## 2024-07-23 ENCOUNTER — APPOINTMENT (OUTPATIENT)
Dept: CARDIOLOGY | Facility: CLINIC | Age: 64
End: 2024-07-23
Payer: COMMERCIAL

## 2024-08-09 ENCOUNTER — OFFICE VISIT (OUTPATIENT)
Dept: CARDIOLOGY | Facility: CLINIC | Age: 64
End: 2024-08-09
Payer: COMMERCIAL

## 2024-08-09 VITALS
SYSTOLIC BLOOD PRESSURE: 131 MMHG | DIASTOLIC BLOOD PRESSURE: 78 MMHG | BODY MASS INDEX: 19.92 KG/M2 | HEART RATE: 61 BPM | WEIGHT: 116.7 LBS | HEIGHT: 64 IN | OXYGEN SATURATION: 99 %

## 2024-08-09 DIAGNOSIS — I10 BENIGN ESSENTIAL HYPERTENSION: ICD-10-CM

## 2024-08-09 DIAGNOSIS — R94.31 ABNORMAL EKG: ICD-10-CM

## 2024-08-09 LAB
ATRIAL RATE: 69 BPM
P AXIS: 57 DEGREES
P OFFSET: 223 MS
P ONSET: 167 MS
PR INTERVAL: 118 MS
Q ONSET: 226 MS
QRS COUNT: 12 BEATS
QRS DURATION: 88 MS
QT INTERVAL: 442 MS
QTC CALCULATION(BAZETT): 473 MS
QTC FREDERICIA: 463 MS
R AXIS: 36 DEGREES
T AXIS: 228 DEGREES
T OFFSET: 447 MS
VENTRICULAR RATE: 69 BPM

## 2024-08-09 PROCEDURE — 4004F PT TOBACCO SCREEN RCVD TLK: CPT | Performed by: INTERNAL MEDICINE

## 2024-08-09 PROCEDURE — 99204 OFFICE O/P NEW MOD 45 MIN: CPT | Performed by: INTERNAL MEDICINE

## 2024-08-09 PROCEDURE — 3075F SYST BP GE 130 - 139MM HG: CPT | Performed by: INTERNAL MEDICINE

## 2024-08-09 PROCEDURE — 93005 ELECTROCARDIOGRAM TRACING: CPT | Performed by: INTERNAL MEDICINE

## 2024-08-09 PROCEDURE — 3078F DIAST BP <80 MM HG: CPT | Performed by: INTERNAL MEDICINE

## 2024-08-09 PROCEDURE — 99214 OFFICE O/P EST MOD 30 MIN: CPT | Performed by: INTERNAL MEDICINE

## 2024-08-09 PROCEDURE — 3008F BODY MASS INDEX DOCD: CPT | Performed by: INTERNAL MEDICINE

## 2024-08-09 ASSESSMENT — ENCOUNTER SYMPTOMS
HEMATURIA: 0
HEADACHES: 0
BLOATING: 0
CONSTIPATION: 0
FALLS: 0
CHILLS: 0
NAUSEA: 0
VOMITING: 0
HEMOPTYSIS: 0
DEPRESSION: 0
MEMORY LOSS: 0
WHEEZING: 0
DIARRHEA: 0
DYSURIA: 0
LOSS OF SENSATION IN FEET: 0
MYALGIAS: 0
ALTERED MENTAL STATUS: 0
ABDOMINAL PAIN: 0
OCCASIONAL FEELINGS OF UNSTEADINESS: 0
FEVER: 0
COUGH: 0

## 2024-08-09 ASSESSMENT — PATIENT HEALTH QUESTIONNAIRE - PHQ9
SUM OF ALL RESPONSES TO PHQ9 QUESTIONS 1 AND 2: 0
1. LITTLE INTEREST OR PLEASURE IN DOING THINGS: NOT AT ALL
2. FEELING DOWN, DEPRESSED OR HOPELESS: NOT AT ALL

## 2024-08-09 ASSESSMENT — COLUMBIA-SUICIDE SEVERITY RATING SCALE - C-SSRS
6. HAVE YOU EVER DONE ANYTHING, STARTED TO DO ANYTHING, OR PREPARED TO DO ANYTHING TO END YOUR LIFE?: NO
1. IN THE PAST MONTH, HAVE YOU WISHED YOU WERE DEAD OR WISHED YOU COULD GO TO SLEEP AND NOT WAKE UP?: NO
2. HAVE YOU ACTUALLY HAD ANY THOUGHTS OF KILLING YOURSELF?: NO

## 2024-08-09 ASSESSMENT — PAIN SCALES - GENERAL: PAINLEVEL: 0-NO PAIN

## 2024-08-09 NOTE — PROGRESS NOTES
Chief Complaint   Patient presents with    Abnormal ECG       Referring Provider Information:  JUNIOR LOCKWOOD     HPI  62 yo BF w/ h/o asthma/allergy now here for cardiology consult. No chest pain. No dyspnea at rest. No MATTA. No orthopnea/PND. No palps. No LH/dizzy/syncope. No edema. No claudication. No cough.  +occ fatigue. No snoring unless congested.  ECG 5/19: SR (66), antlat/inf TWIs  ECG 10/20: SR (76), antlat TWIs  ECG 10/22: SB (53), antlat/inf TWIs  ECG 8/24: SR (69), antlat/inf TWIs  CRESCENCIO 10/20: no ischemia, EF 60-65% -> >75%  CXR 2/24: no acute abnl  CT brain 2/24: no acute abnl  CTA brain 2/19: no sig stenosis    Review of Systems   Constitutional: Negative for chills, fever and malaise/fatigue.   HENT:  Negative for hearing loss.    Eyes:  Negative for visual disturbance.   Respiratory:  Negative for cough, hemoptysis and wheezing.    Skin:  Negative for rash.   Musculoskeletal:  Negative for falls and myalgias.   Gastrointestinal:  Negative for bloating, abdominal pain, constipation, diarrhea, dysphagia, nausea and vomiting.   Genitourinary:  Negative for dysuria and hematuria.   Neurological:  Negative for headaches.   Psychiatric/Behavioral:  Negative for altered mental status, depression and memory loss.       Social History     Tobacco Use    Smoking status: Some Days     Types: Cigarettes     Passive exposure: Never    Smokeless tobacco: Never   Substance Use Topics    Alcohol use: Yes     Alcohol/week: 1.0 standard drink of alcohol     Types: 1 Glasses of wine per week     Comment: twice a month      Family History   Problem Relation Name Age of Onset    Hypertension Mother      Hyperlipidemia Mother      Prostate cancer Father      Migraines Father      Hypertension Father      Hyperlipidemia Father      Allergies Sister      Migraines Brother      Allergies Brother      Allergies Son      Migraines Son      Aneurysm Maternal Grandmother      Alcohol abuse Maternal Grandfather       "Cirrhosis Maternal Grandfather      Other (Heart problems) Paternal Grandmother      Asthma Paternal Grandfather        Allergies   Allergen Reactions    Diphenhydramine Anaphylaxis, Itching and Other     CAUSES UVULA TO SWELL    CAUSES UVULA TO SWELL   CAUSES UVULA TO SWELL    Morphine Hives, Itching and Other    Oxycodone-Acetaminophen Hives     Severe itching    Severe itching   Severe itching    Bee Pollen Other     SEVERE NASAL CONGESTION    SEVERE NASAL CONGESTION   SEVERE NASAL CONGESTION    Codeine Hives and Other    Dog Dander Hives    Hydrocodone-Acetaminophen Hives and Itching     itch    itch   itch   itch    Mold Itching     Itchy eyes    Oxycodone Itching and Rash      Current Outpatient Medications   Medication Instructions    ipratropium-albuteroL (Duo-Neb) 0.5-2.5 mg/3 mL nebulizer solution inhalation       /89 (BP Location: Left arm, Patient Position: Sitting, BP Cuff Size: Large adult)   Pulse 61   Ht 1.626 m (5' 4\")   Wt 52.9 kg (116 lb 11.2 oz)   SpO2 99%   BMI 20.03 kg/m²      Vitals:    08/09/24 1130   BP: 131/78   Pulse:    SpO2:       Physical Exam  Constitutional:       Appearance: Normal appearance.   HENT:      Head: Normocephalic and atraumatic.      Nose: Nose normal.   Neck:      Vascular: No carotid bruit.   Cardiovascular:      Rate and Rhythm: Normal rate and regular rhythm.      Heart sounds: No murmur heard.  Pulmonary:      Effort: Pulmonary effort is normal.      Breath sounds: Normal breath sounds.   Abdominal:      Palpations: Abdomen is soft.      Tenderness: There is no abdominal tenderness.   Musculoskeletal:      Right lower leg: No edema.      Left lower leg: No edema.   Skin:     General: Skin is warm and dry.   Neurological:      General: No focal deficit present.      Mental Status: She is alert.   Psychiatric:         Mood and Affect: Mood normal.         Judgment: Judgment normal.        Labs  2/24 Cr 0.97, K 4.3, Mg 2.1, HGB 15.4, , HSTPN 11  2/22 " HDL 64, NHDL 147, Chol 211  10/20 BNP 27    Assessment/Plan   62 yo BF w/ h/o asthma/allergy now w/ long h/o abnormal EKG (TWIs), ?LVH. No cardiac symptoms. CRESCENCIO 10/20 no ischemia. Check echo and CT cardiac score. AMBREEN Thakkar MD

## 2024-08-14 LAB
ATRIAL RATE: 69 BPM
P AXIS: 57 DEGREES
P OFFSET: 223 MS
P ONSET: 167 MS
PR INTERVAL: 118 MS
Q ONSET: 226 MS
QRS COUNT: 12 BEATS
QRS DURATION: 88 MS
QT INTERVAL: 468 MS
QTC CALCULATION(BAZETT): 502 MS
QTC FREDERICIA: 490 MS
R AXIS: 36 DEGREES
T AXIS: 228 DEGREES
T OFFSET: 447 MS
VENTRICULAR RATE: 69 BPM

## 2024-08-23 ENCOUNTER — HOSPITAL ENCOUNTER (OUTPATIENT)
Dept: CARDIOLOGY | Facility: CLINIC | Age: 64
Discharge: HOME | End: 2024-08-23
Payer: COMMERCIAL

## 2024-08-23 DIAGNOSIS — R94.31 ABNORMAL EKG: ICD-10-CM

## 2024-08-23 LAB
AORTIC VALVE PEAK VELOCITY: 1.08 M/S
AV PEAK GRADIENT: 4.7 MMHG
AVA (PEAK VEL): 3.8 CM2
EJECTION FRACTION APICAL 4 CHAMBER: 57.8
EJECTION FRACTION: 66 %
LEFT ATRIUM VOLUME AREA LENGTH INDEX BSA: 29 ML/M2
LEFT VENTRICLE INTERNAL DIMENSION DIASTOLE: 4.5 CM (ref 3.5–6)
LEFT VENTRICULAR OUTFLOW TRACT DIAMETER: 2.29 CM
MITRAL VALVE E/A RATIO: 0.72
RIGHT VENTRICLE FREE WALL PEAK S': 16 CM/S
RIGHT VENTRICLE PEAK SYSTOLIC PRESSURE: 26.9 MMHG
TRICUSPID ANNULAR PLANE SYSTOLIC EXCURSION: 2.3 CM

## 2024-08-23 PROCEDURE — 93306 TTE W/DOPPLER COMPLETE: CPT | Performed by: INTERNAL MEDICINE

## 2024-08-23 PROCEDURE — 93306 TTE W/DOPPLER COMPLETE: CPT

## 2024-09-09 ENCOUNTER — APPOINTMENT (OUTPATIENT)
Dept: GASTROENTEROLOGY | Facility: EXTERNAL LOCATION | Age: 64
End: 2024-09-09
Payer: COMMERCIAL

## 2024-10-04 ENCOUNTER — APPOINTMENT (OUTPATIENT)
Dept: LAB | Facility: LAB | Age: 64
End: 2024-10-04
Payer: COMMERCIAL

## 2024-10-04 ENCOUNTER — APPOINTMENT (OUTPATIENT)
Dept: PRIMARY CARE | Facility: CLINIC | Age: 64
End: 2024-10-04
Payer: COMMERCIAL

## 2024-10-04 VITALS
SYSTOLIC BLOOD PRESSURE: 140 MMHG | HEART RATE: 75 BPM | WEIGHT: 113.2 LBS | OXYGEN SATURATION: 98 % | DIASTOLIC BLOOD PRESSURE: 80 MMHG | BODY MASS INDEX: 19.33 KG/M2 | HEIGHT: 64 IN

## 2024-10-04 DIAGNOSIS — F17.210 CIGARETTE NICOTINE DEPENDENCE WITHOUT COMPLICATION: ICD-10-CM

## 2024-10-04 DIAGNOSIS — J45.20 MILD INTERMITTENT REACTIVE AIRWAY DISEASE WITHOUT COMPLICATION (HHS-HCC): ICD-10-CM

## 2024-10-04 DIAGNOSIS — Z00.00 ROUTINE GENERAL MEDICAL EXAMINATION AT A HEALTH CARE FACILITY: Primary | ICD-10-CM

## 2024-10-04 DIAGNOSIS — Z23 NEED FOR HEPATITIS B VACCINATION: ICD-10-CM

## 2024-10-04 PROCEDURE — 3077F SYST BP >= 140 MM HG: CPT | Performed by: STUDENT IN AN ORGANIZED HEALTH CARE EDUCATION/TRAINING PROGRAM

## 2024-10-04 PROCEDURE — 3008F BODY MASS INDEX DOCD: CPT | Performed by: STUDENT IN AN ORGANIZED HEALTH CARE EDUCATION/TRAINING PROGRAM

## 2024-10-04 PROCEDURE — 90471 IMMUNIZATION ADMIN: CPT | Performed by: STUDENT IN AN ORGANIZED HEALTH CARE EDUCATION/TRAINING PROGRAM

## 2024-10-04 PROCEDURE — 3079F DIAST BP 80-89 MM HG: CPT | Performed by: STUDENT IN AN ORGANIZED HEALTH CARE EDUCATION/TRAINING PROGRAM

## 2024-10-04 PROCEDURE — 4004F PT TOBACCO SCREEN RCVD TLK: CPT | Performed by: STUDENT IN AN ORGANIZED HEALTH CARE EDUCATION/TRAINING PROGRAM

## 2024-10-04 PROCEDURE — 99214 OFFICE O/P EST MOD 30 MIN: CPT | Performed by: STUDENT IN AN ORGANIZED HEALTH CARE EDUCATION/TRAINING PROGRAM

## 2024-10-04 PROCEDURE — 99396 PREV VISIT EST AGE 40-64: CPT | Performed by: STUDENT IN AN ORGANIZED HEALTH CARE EDUCATION/TRAINING PROGRAM

## 2024-10-04 PROCEDURE — 90739 HEPB VACC 2/4 DOSE ADULT IM: CPT | Performed by: STUDENT IN AN ORGANIZED HEALTH CARE EDUCATION/TRAINING PROGRAM

## 2024-10-04 RX ORDER — NEBULIZER AND COMPRESSOR
1 EACH MISCELLANEOUS 4 TIMES DAILY PRN
Qty: 1 EACH | Refills: 0 | Status: SHIPPED | OUTPATIENT
Start: 2024-10-04

## 2024-10-04 RX ORDER — PENICILLIN V POTASSIUM 500 MG/1
TABLET, FILM COATED ORAL
COMMUNITY
Start: 2024-09-19

## 2024-10-04 RX ORDER — IPRATROPIUM BROMIDE AND ALBUTEROL SULFATE 2.5; .5 MG/3ML; MG/3ML
3 SOLUTION RESPIRATORY (INHALATION)
Qty: 180 ML | Refills: 3 | Status: SHIPPED | OUTPATIENT
Start: 2024-10-04 | End: 2025-10-04

## 2024-10-04 RX ORDER — NEBULIZER AND COMPRESSOR
1 EACH MISCELLANEOUS 4 TIMES DAILY PRN
Qty: 1 EACH | Refills: 0 | Status: SHIPPED | OUTPATIENT
Start: 2024-10-04 | End: 2024-10-04 | Stop reason: SDUPTHER

## 2024-10-04 NOTE — PROGRESS NOTES
"  Subjective     Patient ID: Cory Murillo is a 63 y.o. female who presents for Annual Exam (CPE. Pt declined flu shot. ).        Last Physical : ___Years ago     Pt's PMH, PSH, SH, FH , meds and allergies was obtained / reviewed and updated .     Dental visits : Y  Vision issues ::   Hearing issues : N    Immunizations : Y    Diet :     Exercise:  Weight concerns :     Alcohol: as noted in   Tobacco: as noted in   Recreational drug use : None/ as noted in   =======================================    8lbs loss since April visit   Reports going through lot of stress with change of jobs and home renovation   Normal appetite   No BRBPR/ melena   No fevers / chills/ night sweats     1/2ppd , resumes smoking 1.5 years ago   Did not smoke for 5-6 years ago     Started smoking at age 17, stopped at age 58 with intermittent periods of cessation during this time   1 ppd during the above time period     ======================================================    Visit Vitals  /80   Pulse 75   Ht 1.626 m (5' 4\")   Wt 51.3 kg (113 lb 3.2 oz)   SpO2 98%   BMI 19.43 kg/m²   OB Status Postmenopausal   Smoking Status Some Days   BSA 1.52 m²      No LMP recorded. Patient is postmenopausal.   Current Outpatient Medications   Medication Instructions    ipratropium-albuteroL (Duo-Neb) 0.5-2.5 mg/3 mL nebulizer solution 3 mL, nebulization, 4 times daily RT    nebulizer and compressor device 1 Units, miscellaneous, 4 times daily PRN, Use this device for nebulization    penicillin v potassium (Veetid) 500 mg tablet TAKE 1 TABLET BY MOUTH FOUR TIMES DAILY UNTIL GONE      Social History     Tobacco Use    Smoking status: Some Days     Types: Cigarettes     Passive exposure: Never    Smokeless tobacco: Never   Substance Use Topics    Alcohol use: Yes     Alcohol/week: 1.0 standard drink of alcohol     Types: 1 Glasses of wine per week     Comment: twice a month        =====================================    Review of " systems:  Constitutional: no chills, no fever and no night sweats.     Eyes: no blurred vision and no eyesight problems.     ENT: no hearing loss, no nasal congestion, no nasal discharge, no hoarseness and no sore throat.     Cardiovascular: no chest pain, no intermittent leg claudication, no lower extremity edema, no palpitations and no syncope.     Respiratory: no cough, no shortness of breath during exertion, no shortness of breath at rest and no wheezing.     Gastrointestinal: no abdominal pain, no blood in stools, no constipation, no diarrhea, no melena, no nausea, no rectal pain and no vomiting.     Genitourinary: no dysuria, no change in urinary frequency, no urinary hesitancy, no feelings of urinary urgency and no vaginal discharge.     Musculoskeletal: no arthralgias, no back pain and no myalgias.     Integumentary: no new skin lesions and no rashes.     Neurological: no difficulty walking, no headache, no limb weakness, no numbness and no tingling.     Psychiatric: no anxiety, no depression, no anhedonia and no substance use disorders.   ============================================================    Physical exam :    Constitutional: Alert and in no acute distress. Well developed, well nourished.     Eyes: Normal external exam. Pupils were equal in size, round, reactive to light (PERRL) with normal accommodation and extraocular movements intact (EOMI).     Ears, Nose, Mouth, and Throat: External inspection of ears and nose: Normal.  Otoscopic examination: Normal.      Neck: No neck mass was observed. Supple.     Cardiovascular: Heart rate and rhythm were normal, normal S1 and S2, no gallops, no murmurs and no pericardial rub    Pulmonary: No respiratory distress. Clear bilateral breath sounds.     Abdomen: Soft nontender; no abdominal mass palpated. No organomegaly.     Musculoskeletal: No joint swelling seen, normal movements of all extremities. Range of motion: Normal.  Muscle strength/tone: Normal.       Neurologic: Deep tendon reflexes were 2+ and symmetric. Sensation: Normal.     Psychiatric: Judgment and insight: Intact. Mood and affect: Normal.        Assessment/Plan    Diagnoses and all orders for this visit:  Routine general medical examination at a health care facility  -     Hemoglobin A1C; Future  -     Lipid Panel; Future  -     TSH with reflex to Free T4 if abnormal; Future  Cigarette nicotine dependence without complication  -     CT lung screening low dose; Future  Mild intermittent reactive airway disease without complication (HHS-HCC)  -     ipratropium-albuteroL (Duo-Neb) 0.5-2.5 mg/3 mL nebulizer solution; Take 3 mL by nebulization 4 times a day.  -     nebulizer and compressor device; 1 Units 4 times a day as needed (fro difficulty breathing). Use this device for nebulization  Need for hepatitis B vaccination    62 y/o female with hx of HTN, HLD, with resolution of these with weight loss and hence no longer on rx , nicotine dependence ,  migraines     Weight loss : as noted in HPI   To RTO if losing rapidly   Hep B #1 given today , to rto in 1m for #2        HM :   Vaccines:  -Tdap : 4/2019  -Flu :   declined   -Shingles : at age 51   - hep B#1 given today , needs for her employment     Cancer screenings:  -Mammogram :  current  -Colonoscopy:   scheduled for this month   -PAP :  to fu with gyn   - CT chest for lung cancer screening ordered     General preventive care discussed which includes regular exercise, healthy eating habits, to include more of plant based diet, to include foods of all colors  , limiting excessive red meat intake , staying active to maintain a weight that is appropriate for his/ her height / making efforts to reach an ideal weight for height,  avoidance of smoking, excess alcohol consumption, avoidance of recreational drugs, safe and responsible sexual relationships and practices.     It is recommended to get 150 mins of  moderate intensity  ( you should be able to talk  and not sing ) exercise in a week .     Calcium + Vit D supplements recommended   Regular exercise recommended   Healthy eating choices discussed and recommended   BMI ( Body mass index ) discussed and encouraged weight loss through the above discussed lifestyle modifications .     Conditions addressed and mgmt as noted above.  Pertinent labs, images/ imaging reports , chart review was done .   Age appropriate labs / labs for mgmt of chronic medical conditions ordered, further mgmt pending the results.         RTO  in 1 year or sooner if needed    This note is intended for the physician writing it, as well as to communicate findings to other healthcare professionals. These notes use medical lexicon that may be misunderstood by non medical persons. Therefore, interpretations of medical notes and terminology should be approached with caution.

## 2024-10-18 ENCOUNTER — APPOINTMENT (OUTPATIENT)
Dept: RADIOLOGY | Facility: CLINIC | Age: 64
End: 2024-10-18
Payer: COMMERCIAL

## 2024-11-15 ENCOUNTER — APPOINTMENT (OUTPATIENT)
Dept: LAB | Facility: LAB | Age: 64
End: 2024-11-15
Payer: COMMERCIAL